# Patient Record
Sex: FEMALE | Race: WHITE
[De-identification: names, ages, dates, MRNs, and addresses within clinical notes are randomized per-mention and may not be internally consistent; named-entity substitution may affect disease eponyms.]

---

## 2017-03-07 ENCOUNTER — HOSPITAL ENCOUNTER (OUTPATIENT)
Dept: HOSPITAL 62 - LC | Age: 32
Discharge: HOME | End: 2017-03-07
Attending: OBSTETRICS & GYNECOLOGY
Payer: OTHER GOVERNMENT

## 2017-03-07 DIAGNOSIS — Z3A.36: ICD-10-CM

## 2017-03-07 DIAGNOSIS — F11.90: ICD-10-CM

## 2017-03-07 DIAGNOSIS — O99.323: Primary | ICD-10-CM

## 2017-03-07 PROCEDURE — 59025 FETAL NON-STRESS TEST: CPT

## 2017-03-07 PROCEDURE — 4A1HXCZ MONITORING OF PRODUCTS OF CONCEPTION, CARDIAC RATE, EXTERNAL APPROACH: ICD-10-PCS | Performed by: OBSTETRICS & GYNECOLOGY

## 2017-03-21 LAB
APPEARANCE UR: CLEAR
BARBITURATES UR QL SCN: NEGATIVE
BASOPHILS # BLD AUTO: 0.1 10^3/UL (ref 0–0.2)
BASOPHILS NFR BLD AUTO: 0.6 % (ref 0–2)
BILIRUB UR QL STRIP: NEGATIVE
EOSINOPHIL # BLD AUTO: 0.1 10^3/UL (ref 0–0.6)
EOSINOPHIL NFR BLD AUTO: 1.1 % (ref 0–6)
ERYTHROCYTE [DISTWIDTH] IN BLOOD BY AUTOMATED COUNT: 13.6 % (ref 11.5–14)
GLUCOSE UR STRIP-MCNC: NEGATIVE MG/DL
HCT VFR BLD CALC: 36.2 % (ref 36–47)
HGB BLD-MCNC: 12.1 G/DL (ref 12–15.5)
HGB HCT DIFFERENCE: 0.1
KETONES UR STRIP-MCNC: NEGATIVE MG/DL
LYMPHOCYTES # BLD AUTO: 2.6 10^3/UL (ref 0.5–4.7)
LYMPHOCYTES NFR BLD AUTO: 18.9 % (ref 13–45)
MCH RBC QN AUTO: 26.7 PG (ref 27–33.4)
MCHC RBC AUTO-ENTMCNC: 33.3 G/DL (ref 32–36)
MCV RBC AUTO: 80 FL (ref 80–97)
METHADONE UR QL SCN: (no result)
MONOCYTES # BLD AUTO: 0.8 10^3/UL (ref 0.1–1.4)
MONOCYTES NFR BLD AUTO: 5.9 % (ref 3–13)
NEUTROPHILS # BLD AUTO: 10 10^3/UL (ref 1.7–8.2)
NEUTS SEG NFR BLD AUTO: 73.5 % (ref 42–78)
NITRITE UR QL STRIP: NEGATIVE
PCP UR QL SCN: NEGATIVE
PH UR STRIP: 6 [PH] (ref 5–9)
PROT UR STRIP-MCNC: NEGATIVE MG/DL
RBC # BLD AUTO: 4.52 10^6/UL (ref 3.72–5.28)
SP GR UR STRIP: 1.02
URINE OPIATES LOW: NEGATIVE
UROBILINOGEN UR-MCNC: NEGATIVE MG/DL (ref ?–2)
WBC # BLD AUTO: 13.6 10^3/UL (ref 4–10.5)

## 2017-03-22 ENCOUNTER — HOSPITAL ENCOUNTER (INPATIENT)
Dept: HOSPITAL 62 - 2S | Age: 32
LOS: 2 days | Discharge: HOME | End: 2017-03-24
Attending: OBSTETRICS & GYNECOLOGY | Admitting: OBSTETRICS & GYNECOLOGY
Payer: OTHER GOVERNMENT

## 2017-03-22 DIAGNOSIS — Z88.0: ICD-10-CM

## 2017-03-22 DIAGNOSIS — F11.20: ICD-10-CM

## 2017-03-22 DIAGNOSIS — Z3A.39: ICD-10-CM

## 2017-03-22 DIAGNOSIS — Z28.82: ICD-10-CM

## 2017-03-22 DIAGNOSIS — F17.210: ICD-10-CM

## 2017-03-22 DIAGNOSIS — O34.211: Primary | ICD-10-CM

## 2017-03-22 PROCEDURE — 85025 COMPLETE CBC W/AUTO DIFF WBC: CPT

## 2017-03-22 PROCEDURE — 4A1HXCZ MONITORING OF PRODUCTS OF CONCEPTION, CARDIAC RATE, EXTERNAL APPROACH: ICD-10-PCS | Performed by: OBSTETRICS & GYNECOLOGY

## 2017-03-22 PROCEDURE — 86901 BLOOD TYPING SEROLOGIC RH(D): CPT

## 2017-03-22 PROCEDURE — 88307 TISSUE EXAM BY PATHOLOGIST: CPT

## 2017-03-22 PROCEDURE — 36415 COLL VENOUS BLD VENIPUNCTURE: CPT

## 2017-03-22 PROCEDURE — 59025 FETAL NON-STRESS TEST: CPT

## 2017-03-22 PROCEDURE — 80307 DRUG TEST PRSMV CHEM ANLYZR: CPT

## 2017-03-22 PROCEDURE — 94799 UNLISTED PULMONARY SVC/PX: CPT

## 2017-03-22 PROCEDURE — 86850 RBC ANTIBODY SCREEN: CPT

## 2017-03-22 PROCEDURE — 86900 BLOOD TYPING SEROLOGIC ABO: CPT

## 2017-03-22 PROCEDURE — 81001 URINALYSIS AUTO W/SCOPE: CPT

## 2017-03-22 PROCEDURE — 85027 COMPLETE CBC AUTOMATED: CPT

## 2017-03-22 RX ADMIN — DOCUSATE SODIUM SCH MG: 100 CAPSULE, LIQUID FILLED ORAL at 18:44

## 2017-03-22 RX ADMIN — HYDROMORPHONE HYDROCHLORIDE PRN MG: 2 INJECTION INTRAMUSCULAR; INTRAVENOUS; SUBCUTANEOUS at 13:53

## 2017-03-22 RX ADMIN — KETOROLAC TROMETHAMINE SCH MG: 30 INJECTION, SOLUTION INTRAMUSCULAR at 20:12

## 2017-03-22 RX ADMIN — FENTANYL CITRATE ONE MCG: 50 INJECTION INTRAMUSCULAR; INTRAVENOUS at 11:47

## 2017-03-22 RX ADMIN — HYDROMORPHONE HYDROCHLORIDE PRN MG: 2 INJECTION INTRAMUSCULAR; INTRAVENOUS; SUBCUTANEOUS at 22:15

## 2017-03-22 RX ADMIN — HYDROMORPHONE HYDROCHLORIDE PRN MG: 2 INJECTION INTRAMUSCULAR; INTRAVENOUS; SUBCUTANEOUS at 17:07

## 2017-03-22 RX ADMIN — FENTANYL CITRATE ONE MCG: 50 INJECTION INTRAMUSCULAR; INTRAVENOUS at 11:53

## 2017-03-23 LAB
ERYTHROCYTE [DISTWIDTH] IN BLOOD BY AUTOMATED COUNT: 13.6 % (ref 11.5–14)
HCT VFR BLD CALC: 32.8 % (ref 36–47)
HGB BLD-MCNC: 11.2 G/DL (ref 12–15.5)
HGB HCT DIFFERENCE: 0.8
MCH RBC QN AUTO: 27.4 PG (ref 27–33.4)
MCHC RBC AUTO-ENTMCNC: 34 G/DL (ref 32–36)
MCV RBC AUTO: 80 FL (ref 80–97)
RBC # BLD AUTO: 4.08 10^6/UL (ref 3.72–5.28)
WBC # BLD AUTO: 7.3 10^3/UL (ref 4–10.5)

## 2017-03-23 RX ADMIN — DOCUSATE SODIUM SCH MG: 100 CAPSULE, LIQUID FILLED ORAL at 17:31

## 2017-03-23 RX ADMIN — OXYCODONE AND ACETAMINOPHEN PRN TAB: 5; 325 TABLET ORAL at 02:42

## 2017-03-23 RX ADMIN — IBUPROFEN SCH MG: 800 TABLET, FILM COATED ORAL at 21:18

## 2017-03-23 RX ADMIN — OXYCODONE AND ACETAMINOPHEN PRN TAB: 5; 325 TABLET ORAL at 17:31

## 2017-03-23 RX ADMIN — KETOROLAC TROMETHAMINE SCH MG: 30 INJECTION, SOLUTION INTRAMUSCULAR at 03:44

## 2017-03-23 RX ADMIN — IBUPROFEN SCH MG: 800 TABLET, FILM COATED ORAL at 14:36

## 2017-03-23 NOTE — PDOC PROGRESS REPORT
Subjective-OB


Subjective: 


Post Delivery Day:








31 year old.  Denies any needs at this time.  Pt doing well, no concerns. She 

reports light bleeding, regular diet, +flatus and voiding without difficulty.   


 








Physical Exam (OB)


Vital Signs: 


 











Temp Pulse Resp BP Pulse Ox


 


 98.1 F   87   17   104/54 L  100 


 


 17 07:52  17 07:52  17 07:52  17 07:52  17 07:52








 Intake & Output











 17





 06:59 06:59 06:59


 


Intake Total  3000 


 


Output Total  2200 


 


Balance  800 


 


Weight 90.72 kg 86.183 kg 














- 


Dressing Removed: Yes


Incision: Dressing, Well Approximated


Closure Type: medipore





- Lochia


Lochia Amount: Scant < 10 ml


Lochia Color: Rubra/Red





- Abdomen


Description: Tender, Soft


Hernia Present: No


Fundal Description: Firm, Midline


Fundal Height: u/u - u/2





Objective-Diagnostic


Laboratory: 


 





 17 06:03 





 











  17





  06:03


 


WBC  7.3


 


RBC  4.08


 


Hgb  11.2 L


 


Hct  32.8 L


 


MCV  80


 


MCH  27.4


 


MCHC  34.0


 


RDW  13.6


 


Plt Count  244














Assessment and Plan(PN)





- Assessment and Plan


(1)  delivery delivered


Is this a current diagnosis for this admission?: Yes








- Time Spent with Patient


Time with patient: Less than 15 minutes


Medications reviewed and adjusted accordingly: Yes





- Disposition


Anticipated Discharge: Home


Within: within 24 hours

## 2017-03-24 VITALS — DIASTOLIC BLOOD PRESSURE: 52 MMHG | SYSTOLIC BLOOD PRESSURE: 92 MMHG

## 2017-03-24 RX ADMIN — DOCUSATE SODIUM SCH MG: 100 CAPSULE, LIQUID FILLED ORAL at 09:40

## 2017-03-24 RX ADMIN — IBUPROFEN SCH MG: 800 TABLET, FILM COATED ORAL at 02:02

## 2017-03-24 RX ADMIN — OXYCODONE AND ACETAMINOPHEN PRN TAB: 5; 325 TABLET ORAL at 05:34

## 2017-03-24 RX ADMIN — IBUPROFEN SCH MG: 800 TABLET, FILM COATED ORAL at 09:41

## 2017-03-24 NOTE — PDOC PROGRESS REPORT
Subjective-OB


Subjective: 


Post Delivery Day:








31 year old.  Denies any needs at this time.  Ready to go home. 








Physical Exam (OB)


Vital Signs: 


 











Temp Pulse Resp BP Pulse Ox


 


 97.8 F   59 L  16   92/52 L  98 


 


 17 08:09  17 08:09  17 08:09  17 08:09  17 08:09








 Intake & Output











 17





 06:59 06:59 06:59


 


Intake Total 3000  


 


Output Total 2200  


 


Balance 800  


 


Weight 86.183 kg  














- 


Dressing Removed: No


Incision: Dressing


Closure Type: medipore





- Lochia


Lochia Amount: Scant < 10 ml


Lochia Color: Rubra/Red





- Abdomen


Description: Soft, Round


Hernia Present: No


Bowel Sounds: Normoactive


Flatus Presence: Present


Stool: Yes


Fundal Description: Firm, Midline


Fundal Height: u/u - u/2





Objective-Diagnostic


Laboratory: 


 





 17 06:03 











Assessment and Plan(PN)





- Time Spent with Patient


Medications reviewed and adjusted accordingly: Yes





- Disposition


Anticipated Discharge: Home

## 2017-03-24 NOTE — PDOC DISCHARGE SUMMARY
Final Diagnosis


Discharge Date: 17





- Final Diagnosis


(1) Abnormal  AFP screen


Is this a current diagnosis for this admission?: Yes





(2)  delivery delivered


Is this a current diagnosis for this admission?: Yes





(3) Methadone dependence


Is this a current diagnosis for this admission?: Yes





(4) Pregnancy


Is this a current diagnosis for this admission?: Yes





(5) Smoker


Is this a current diagnosis for this admission?: Yes








Discharge Data





- Discharge Medication


Home Medications: 








Methadone HCl [Methadone Oral Soln 1Mg/ml 30 ml Bottle] 117 mg PO DAILY  


Pnv No.122/Iron/Folic Acid [Prenatal Multi Tablet] 1 tab PO DAILY 17 


Ibuprofen [Motrin 800 mg Tablet] 800 mg PO Q6A #30 tablet 17 








Gestational Age: 39 wks


Reason(s) for Admission: Ceasarean Section-Repeat


Prenatal Procedures: Ultrasound


Intrapartum Procedure(s): : Low Cervical, Transverse





- [place] Data


  ** Baby 1 Male


Apgar at 1 minute: 5


Apgar at 5 minutes: 9


Weight: 2.835 kg


Home with Mother: Yes


Complications: No





- Diagnosis Test


Laboratory: 


 











Temp Pulse Resp BP Pulse Ox


 


 97.8 F   59 L  16   92/52 L  98 


 


 17 08:09  17 08:09  17 08:09  17 08:09  17 08:09








 











  17





  10:30 10:40 06:03


 


RBC   4.52  4.08


 


Hgb   12.1  11.2 L


 


Hct   36.2  32.8 L


 


Urine Opiates Screen  NEGATIVE  














- Discharge information/Instructions


Discharge Activity: Activity As Tolerated, Balance Activity w/Rest, No Lifting 

Over 10 Pounds, No Lifting/Push/Pulling, Non-Ambulatory Child, Pelvic Rest, 

Slowly Increase Activity


Discharge Diet: Regular


Disposition: HOME, SELF-CARE


Follow up with: [facility]


in: 1, Weeks

## 2017-05-10 NOTE — OPERATIVE REPORT E
Operative Report



NAME: ELVIS PAINTING

MRN:  [id number]          : 1985 AGE:  31Y

DATE OF SURGERY: 2017                        ROOM: 226



PREOPERATIVE DIAGNOSES:

1.   A 39 week intrauterine pregnancy.

2.   History of  section x2.



POSTOPERATIVE DIAGNOSES:

1.   A 39 week intrauterine pregnancy.

2.   History of  section x2.



OPERATION:

Repeat low transverse  section.



SURGEON:

Semaj Saucedo D.O.



ASSISTANT:

None.



ANESTHESIA:

Spinal.



COMPLICATIONS:

None.



ESTIMATED BLOOD LOSS:

600 mL



PATHOLOGY:

Placenta.



FINDINGS:

1.   A viable male infant at 10:42 a.m. on 2017.  Apgars 8 at one and

     9 at five.  Weight pending dictation.

2.   Normal bilateral fallopian tubes and ovaries.



PROCEDURE:

The patient was taken to the operating room, where spinal anesthesia was

administered.  Once this was done, she was placed in the dorsal supine

position with leftward tilt upon the operating table.  She was then

prepped and draped in normal sterile fashion.  A scalpel was then used to

make a Pfannenstiel skin incision.  The skin incision was carried down

through subcutaneous tissues to the layer of the fascia.  The fascia was

incised in the midline.  Fascial incision was then extended using Bovie

cautery.  The superior fascial edge was then grasped with Kocher clamps,

elevated, and the rectus muscles dissected off sharply and bluntly. 

Attention was then turned to the inferior fascial edge, which was elevated

by Kocher clamps and rectus muscles dissected off sharply and bluntly. 

The rectus muscles were then  in the midline, peritoneum

identified and entered bluntly with the surgeon's hands.  A bladder blade

was inserted.  A scalpel was then used to make a low transverse

hysterotomy incision.  The infant was found to be in cephalic position and

delivered through this incision without difficulty.  The nose and mouth

were suctioned.  Cord was clamped and cut, and the infant was handed off

to the waiting nurses.  Cord blood was obtained.  The placenta was then

manually removed from the uterus.  The uterus was exteriorized and cleared

of all clots and debris.  The hysterotomy incision was then reapproximated

using 2 layers of 1-0 Vicryl in a running locking fashion.  Following

closure with a single layer, excellent hemostasis was noted.  The uterus

was then returned to the abdomen.  The bilateral pericolic gutters were

irrigated and cleared of all clots and debris.  Again, the hysterotomy

incision was reinspected and found to have excellent hemostasis.  The

rectus muscles were then reapproximated using 1-0 Vicryl interrupted

sutures.  The fascia was then closed using 1-0 Vicryl in a running

non-locking fashion.  The subcutaneous space was then made hemostatic

using Bovie cautery.  The skin was then closed with dissolvable staples,

covered with an OpSite, and then with a pressure dressing.  At this point

in time, the procedure was terminated.  All sponge, lap, and needle counts

were correct x2.  The patient tolerated the procedure well. The patient

was taken to the recovery room in stable condition.



DICTATING PHYSICIAN:  Semaj Saucedo DO





[id number]                  DT: 05/10/2017    09:13 AM

PHY#: [id number]            DD: 05/10/2017    08:52 AM

ID:   [id number]           JOB#: [id number]       ACCT: [id number]



cc:Semaj Saucedo D.O.

>

## 2020-10-09 ENCOUNTER — HOSPITAL ENCOUNTER (EMERGENCY)
Dept: HOSPITAL 62 - ER | Age: 35
Discharge: HOME | End: 2020-10-09
Payer: OTHER GOVERNMENT

## 2020-10-09 VITALS — SYSTOLIC BLOOD PRESSURE: 124 MMHG | DIASTOLIC BLOOD PRESSURE: 78 MMHG

## 2020-10-09 DIAGNOSIS — M54.2: Primary | ICD-10-CM

## 2020-10-09 DIAGNOSIS — R07.9: ICD-10-CM

## 2020-10-09 DIAGNOSIS — M54.9: ICD-10-CM

## 2020-10-09 DIAGNOSIS — M79.10: ICD-10-CM

## 2020-10-09 DIAGNOSIS — Z88.0: ICD-10-CM
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
## 2020-10-09 DIAGNOSIS — K38.8: ICD-10-CM

## 2020-10-09 DIAGNOSIS — R41.3: ICD-10-CM

## 2020-10-09 LAB
ADD MANUAL DIFF: NO
ANION GAP SERPL CALC-SCNC: 10 MMOL/L (ref 5–19)
BASOPHILS # BLD AUTO: 0 10^3/UL (ref 0–0.2)
BASOPHILS NFR BLD AUTO: 0.2 % (ref 0–2)
BUN SERPL-MCNC: 13 MG/DL (ref 7–20)
CALCIUM: 9.7 MG/DL (ref 8.4–10.2)
CHLORIDE SERPL-SCNC: 101 MMOL/L (ref 98–107)
CO2 SERPL-SCNC: 28 MMOL/L (ref 22–30)
EOSINOPHIL # BLD AUTO: 0.1 10^3/UL (ref 0–0.6)
EOSINOPHIL NFR BLD AUTO: 0.5 % (ref 0–6)
ERYTHROCYTE [DISTWIDTH] IN BLOOD BY AUTOMATED COUNT: 14.8 % (ref 11.5–14)
GLUCOSE SERPL-MCNC: 99 MG/DL (ref 75–110)
HCT VFR BLD CALC: 43.2 % (ref 36–47)
HGB BLD-MCNC: 14.7 G/DL (ref 12–15.5)
LYMPHOCYTES # BLD AUTO: 2.7 10^3/UL (ref 0.5–4.7)
LYMPHOCYTES NFR BLD AUTO: 13.8 % (ref 13–45)
MCH RBC QN AUTO: 27.6 PG (ref 27–33.4)
MCHC RBC AUTO-ENTMCNC: 33.9 G/DL (ref 32–36)
MCV RBC AUTO: 81 FL (ref 80–97)
MONOCYTES # BLD AUTO: 1 10^3/UL (ref 0.1–1.4)
MONOCYTES NFR BLD AUTO: 5.2 % (ref 3–13)
NEUTROPHILS # BLD AUTO: 15.5 10^3/UL (ref 1.7–8.2)
NEUTS SEG NFR BLD AUTO: 80.3 % (ref 42–78)
PLATELET # BLD: 415 10^3/UL (ref 150–450)
POTASSIUM SERPL-SCNC: 4.5 MMOL/L (ref 3.6–5)
RBC # BLD AUTO: 5.31 10^6/UL (ref 3.72–5.28)
TOTAL CELLS COUNTED % (AUTO): 100 %
WBC # BLD AUTO: 19.3 10^3/UL (ref 4–10.5)

## 2020-10-09 PROCEDURE — 74177 CT ABD & PELVIS W/CONTRAST: CPT

## 2020-10-09 PROCEDURE — 70450 CT HEAD/BRAIN W/O DYE: CPT

## 2020-10-09 PROCEDURE — 71260 CT THORAX DX C+: CPT

## 2020-10-09 PROCEDURE — 99285 EMERGENCY DEPT VISIT HI MDM: CPT

## 2020-10-09 PROCEDURE — 72125 CT NECK SPINE W/O DYE: CPT

## 2020-10-09 PROCEDURE — 80048 BASIC METABOLIC PNL TOTAL CA: CPT

## 2020-10-09 PROCEDURE — 85025 COMPLETE CBC W/AUTO DIFF WBC: CPT

## 2020-10-09 PROCEDURE — 36415 COLL VENOUS BLD VENIPUNCTURE: CPT
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
